# Patient Record
Sex: FEMALE | Race: WHITE | NOT HISPANIC OR LATINO | URBAN - METROPOLITAN AREA
[De-identification: names, ages, dates, MRNs, and addresses within clinical notes are randomized per-mention and may not be internally consistent; named-entity substitution may affect disease eponyms.]

---

## 2017-02-28 ENCOUNTER — OUTPATIENT (OUTPATIENT)
Dept: OUTPATIENT SERVICES | Facility: HOSPITAL | Age: 72
LOS: 1 days | End: 2017-02-28

## 2017-07-25 ENCOUNTER — OUTPATIENT (OUTPATIENT)
Dept: OUTPATIENT SERVICES | Facility: HOSPITAL | Age: 72
LOS: 1 days | End: 2017-07-25

## 2017-09-05 ENCOUNTER — OUTPATIENT (OUTPATIENT)
Dept: OUTPATIENT SERVICES | Facility: HOSPITAL | Age: 72
LOS: 1 days | End: 2017-09-05

## 2022-04-13 ENCOUNTER — APPOINTMENT (OUTPATIENT)
Dept: ORTHOPEDIC SURGERY | Facility: CLINIC | Age: 77
End: 2022-04-13

## 2022-04-13 PROBLEM — Z00.00 ENCOUNTER FOR PREVENTIVE HEALTH EXAMINATION: Status: ACTIVE | Noted: 2022-04-13

## 2022-04-20 ENCOUNTER — TRANSCRIPTION ENCOUNTER (OUTPATIENT)
Age: 77
End: 2022-04-20

## 2022-04-20 ENCOUNTER — APPOINTMENT (OUTPATIENT)
Dept: ORTHOPEDIC SURGERY | Facility: CLINIC | Age: 77
End: 2022-04-20
Payer: MEDICARE

## 2022-04-20 DIAGNOSIS — M25.511 PAIN IN RIGHT SHOULDER: ICD-10-CM

## 2022-04-20 DIAGNOSIS — Z78.9 OTHER SPECIFIED HEALTH STATUS: ICD-10-CM

## 2022-04-20 DIAGNOSIS — S43.431A SUPERIOR GLENOID LABRUM LESION OF RIGHT SHOULDER, INITIAL ENCOUNTER: ICD-10-CM

## 2022-04-20 PROCEDURE — 20611 DRAIN/INJ JOINT/BURSA W/US: CPT | Mod: LT

## 2022-04-20 PROCEDURE — 99204 OFFICE O/P NEW MOD 45 MIN: CPT | Mod: 57,25

## 2022-04-20 PROCEDURE — 99214 OFFICE O/P EST MOD 30 MIN: CPT

## 2022-04-20 NOTE — DISCUSSION/SUMMARY
[de-identified] : 76 yo female presents with left shoulder pain following a fall on 2/11/22 where she suffered a dislocated shoulder. She was intially seen at the ER where she was unconsciously reduced.\par MRI demonstrates no rotator cuff tear\par During todays visit, patient received US guided subacromial injection for therapeutic purposes \par Patient received a PT prescription to be followed according to rotator cuff protocol \par She will them transition to HEP\par Demonstrated supine FF for HEP as well\par Follow up as needed\par \par \par \par (1) We discussed a comprehensive treatment plans that included a prescription management plan involving the use of prescription strength medications to include Ibuprofen 600-800 mg TID, versus 500-650 mg Tylenol. We also discussed prescribing topical diclofenac (Voltaren gel) as well as once daily Meloxicam 15 mg.\par (2) The patient has More Than One chronic injuries/illnesses as outlined, discussed, and documented by ICD 10 codes listed, as well as the HPI and Plan section.\par There is a moderate risk of morbidity with further treatment, especially from use of prescription strength medications and possible side effects of these medications which include upset stomach and cardiac/renal issues with long term use were discussed.\par (3) I recommended that the patient follow-up with their medical physician to discuss any significant specific potential issues with long term use such as interactions with current medications or with exacerbation of underlying medical morbidities. \par \par Attestation:\par I, Beth Mark , attest that this documentation has been prepared under the direction and in the presence of Provider Ezekiel Mulligan MD.\par The documentation recorded by the scribe, in my presence, accurately reflects the service I personally performed, and the decisions made by me with my edits as appropriate.\par Ezekiel Mulligan MD\par \par \par

## 2022-04-20 NOTE — HISTORY OF PRESENT ILLNESS
[de-identified] : 78 yo female presents with left shoulder pain following a fall on 2/11/22 where she suffered a dislocated shoulder. She was intially seen at the ER where she was reduced under sedation. She has significant difficulty with lifting her LUE. Patient denies any PT. Denies any numbness or tingling. She presents with an MRI for review. \par

## 2022-04-20 NOTE — PROCEDURE
[Large Joint Injection] : Large joint injection [Left] : of the left [Subacromial Space] : subacromial space [FreeTextEntry3] : Large Joint Injection was performed because of pain and inflammation. \par Anesthesia: ethyl chloride sprayed topically.. \par Depomedrol: An injection of Depomedrol 40 mg , 1 cc. \par Lidocaine: 7 cc. \par \par Medication was injected in the left subacromial space. Patient has tried OTC's including aspirin, Ibuprofen, Aleve etc or prescription NSAIDS, and/or exercises at home and/ or physical therapy without satisfactory response and Patient has decreased mobility in the joint. After verbal consent using sterile preparation and technique. The risks, benefits, and alternatives to cortisone injection were explained in full to the patient. Risks outlined include but are not limited to infection, sepsis, bleeding, scarring, skin discoloration, temporary increase in pain, syncopal episode, failure to resolve symptoms, allergic reaction, symptom recurrence, and elevation of blood sugar in diabetics. Patient understood the risks. All questions were answered. After discussion of options, patient requested an injection. Oral informed consent was obtained and sterile prep was done of the injection site. Sterile technique was utilized for the procedure including the preparation of the solutions used for the injection. Patient tolerated the procedure well. Advised to ice the injection site this evening. Prep with alcohol locally to site. Sterile technique used. Patient tolerated procedure well. Post Procedure Instructions: Patient was advised to call if redness, pain, or fever occur and apply ice for 15 min. out of every hour for the next 12-24 hours as tolerated. patient was advised to rest the joint(s) for 7 days. \par Ultrasound Guidance was used for the following reasons: prior failure or difficult injection. \par \par Ultrasound guided injection was performed of the shoulder, visualization of the needle and placement of injection was performed without complication.\par \par

## 2022-04-20 NOTE — PHYSICAL EXAM
[de-identified] : Constitutional: The general appearance of the patient is well developed, well nourished, no deformities and well groomed. Normal \par \par Gait: Gait and function is as follows: normal gait. \par \par Skin: Head and neck visualized skin is normal. Left upper extremity visualized skin is normal. Right upper extremity visualized skin is normal. Thoracic Skin of the thoracic spine shows visualized skin is normal. \par \par Cardiovascular: palpable radial pulse bilaterally, good capillary refill in digits of the bilateral upper extremities and no temperature or color changes in the bilateral upper extremities. \par \par Lymphatic: Normal Palpation of lymph nodes in the cervical. \par \par Neurologic: fine motor control in the bilateral upper extremities is intact. Deep Tendon Reflexes in Upper and Lower Extremities Negative Lara's in the bilateral upper extremities. The patient is oriented to time, place and person. Sensation to light touch intact in the bilateral upper extremities. Mood and Affect is normal. \par \par Right Shoulder:  Inspection of the shoulder/upper arm is as follows: There is a full, pain-free, stable range of motion of the shoulder with normal strength and no tenderness to palpation. \par \par Left Shoulder: Inspection of the shoulder/upper arm is as follows: no scapula winging, no biceps deformity and no AC joint deformity. Palpation of the shoulder/upper arm is as follows: There is tenderness at the proximal biceps tendon. Range of motion of the shoulder is as follows: Pain with internal rotation, external rotation, abduction and forward flexion. Strength of the shoulder is as follows: Supraspinatus 4/5. External Rotation 4/5. Internal Rotation 4/5. Deltoid 5/5 Ligament Stability and Special Tests of the shoulder is as follows: Neer test is positive. Tai' test is positive. Speed's test is positive. \par \par Neck: \par Inspection / Palpation of the cervical spine is as follows: There is a full, pain free, stable range of motion of the cervical spine with normal tone and no tenderness to palpation. \par \par \par Back, including spine: Inspection / Palpation of the thoracic/lumbar spine is as follows: There is a full, pain free, stable range of motion of the thoracic spine with a normal tone and not tenderness to palpation..\par

## 2022-07-22 ENCOUNTER — APPOINTMENT (OUTPATIENT)
Dept: ORTHOPEDIC SURGERY | Facility: CLINIC | Age: 77
End: 2022-07-22

## 2022-07-22 DIAGNOSIS — M75.01 ADHESIVE CAPSULITIS OF RIGHT SHOULDER: ICD-10-CM

## 2022-07-22 DIAGNOSIS — S46.011A STRAIN OF MUSCLE(S) AND TENDON(S) OF THE ROTATOR CUFF OF RIGHT SHOULDER, INITIAL ENCOUNTER: ICD-10-CM

## 2022-07-22 PROCEDURE — 99214 OFFICE O/P EST MOD 30 MIN: CPT | Mod: 25

## 2022-07-22 PROCEDURE — 20611 DRAIN/INJ JOINT/BURSA W/US: CPT

## 2022-07-22 NOTE — PHYSICAL EXAM
[de-identified] : Constitutional: The general appearance of the patient is well developed, well nourished, no deformities and well groomed. Normal \par \par Gait: Gait and function is as follows: normal gait. \par \par Skin: Head and neck visualized skin is normal. Left upper extremity visualized skin is normal. Right upper extremity visualized skin is normal. Thoracic Skin of the thoracic spine shows visualized skin is normal. \par \par Cardiovascular: palpable radial pulse bilaterally, good capillary refill in digits of the bilateral upper extremities and no temperature or color changes in the bilateral upper extremities. \par \par Lymphatic: Normal Palpation of lymph nodes in the cervical. \par \par Neurologic: fine motor control in the bilateral upper extremities is intact. Deep Tendon Reflexes in Upper and Lower Extremities Negative Lara's in the bilateral upper extremities. The patient is oriented to time, place and person. Sensation to light touch intact in the bilateral upper extremities. Mood and Affect is normal. \par \par Right Shoulder:  Inspection of the shoulder/upper arm is as follows: There is a full, pain-free, stable range of motion of the shoulder with normal strength and no tenderness to palpation. \par \par Left Shoulder: Inspection of the shoulder/upper arm is as follows: no scapula winging, no biceps deformity and no AC joint deformity. Palpation of the shoulder/upper arm is as follows: There is tenderness at the proximal biceps tendon. Range of motion of the shoulder is as follows: Pain with internal rotation, external rotation, abduction and forward flexion. Strength of the shoulder is as follows: Supraspinatus 4/5. External Rotation 4/5. Internal Rotation 4/5. Deltoid 5/5 Ligament Stability and Special Tests of the shoulder is as follows: Neer test is positive. Tai' test is positive. Speed's test is positive. \par \par Neck: \par Inspection / Palpation of the cervical spine is as follows: There is a full, pain free, stable range of motion of the cervical spine with normal tone and no tenderness to palpation. \par \par \par Back, including spine: Inspection / Palpation of the thoracic/lumbar spine is as follows: There is a full, pain free, stable range of motion of the thoracic spine with a normal tone and not tenderness to palpation..\par

## 2022-07-22 NOTE — DISCUSSION/SUMMARY
[de-identified] : 76 yo female presents with left shoulder pain following a fall on 2/11/22 where she suffered a dislocated shoulder. \par MRI demonstrates no rotator cuff tear\par She was treated today with US guided subacromial injection for therapeutic purposes .\par Also discussed formal course of supervised PT however patient wishes to comlete HEP. \par She did have moderate stiffness on exam today. \par In addition she reports left sided radicular pain and numbness to her left hand. \par We discussed likely etiology of symptoms are cervical spine. She will follow up with pain management to consider trigger point injectoin vs epidural. \par She was prescribed MDp to help alleviate inflammation. \par Follow up as needed\par \par \par (1) We discussed a comprehensive treatment plans that included a prescription management plan involving the use of prescription strength medications to include Ibuprofen 600-800 mg TID, versus 500-650 mg Tylenol. We also discussed prescribing topical diclofenac (Voltaren gel) as well as once daily Meloxicam 15 mg.\par (2) The patient has More Than One chronic injuries/illnesses as outlined, discussed, and documented by ICD 10 codes listed, as well as the HPI and Plan section.\par There is a moderate risk of morbidity with further treatment, especially from use of prescription strength medications and possible side effects of these medications which include upset stomach and cardiac/renal issues with long term use were discussed.\par (3) I recommended that the patient follow-up with their medical physician to discuss any significant specific potential issues with long term use such as interactions with current medications or with exacerbation of underlying medical morbidities. \par \par Attestation:\par I, Beth Mark , attest that this documentation has been prepared under the direction and in the presence of Provider Ezekiel Mulligan MD.\par The documentation recorded by the scribe, in my presence, accurately reflects the service I personally performed, and the decisions made by me with my edits as appropriate.\par Ezekiel Mulligan MD\par \par \par

## 2022-07-22 NOTE — HISTORY OF PRESENT ILLNESS
[de-identified] : 78 yo female presents with left shoulder pain following a fall on 2/11/22 where she suffered a dislocated shoulder. She was intially seen at the ER where she was reduced under sedation. She has significant difficulty with lifting her LUE. Patient denies any PT. Denies any numbness or tingling. She presents with an MRI for review. \par 7/22/22: Patient returns today for follow up of left shoulder pain. She sustained traumatic dislocation 2/11/22. She reports previous cortisone injection provided mild relief. She continues to note significant shoulder pain as well as left sided neck stiffness.  [FreeTextEntry1] : left shoulder pain

## 2022-07-22 NOTE — PROCEDURE
[Large Joint Injection] : Large joint injection [Left] : of the left [Shoulder] : shoulder [FreeTextEntry3] : Large Joint Injection was performed because of pain and inflammation. \par Anesthesia: ethyl chloride sprayed topically.. \par Depomedrol: An injection of Depomedrol 40 mg , 1 cc. \par Lidocaine: 7 cc. \par \par Medication was injected in the left subacromial space. Patient has tried OTC's including aspirin, Ibuprofen, Aleve etc or prescription NSAIDS, and/or exercises at home and/ or physical therapy without satisfactory response and Patient has decreased mobility in the joint. After verbal consent using sterile preparation and technique. The risks, benefits, and alternatives to cortisone injection were explained in full to the patient. Risks outlined include but are not limited to infection, sepsis, bleeding, scarring, skin discoloration, temporary increase in pain, syncopal episode, failure to resolve symptoms, allergic reaction, symptom recurrence, and elevation of blood sugar in diabetics. Patient understood the risks. All questions were answered. After discussion of options, patient requested an injection. Oral informed consent was obtained and sterile prep was done of the injection site. Sterile technique was utilized for the procedure including the preparation of the solutions used for the injection. Patient tolerated the procedure well. Advised to ice the injection site this evening. Prep with alcohol locally to site. Sterile technique used. Patient tolerated procedure well. Post Procedure Instructions: Patient was advised to call if redness, pain, or fever occur and apply ice for 15 min. out of every hour for the next 12-24 hours as tolerated. patient was advised to rest the joint(s) for 7 days. \par Ultrasound Guidance was used for the following reasons: prior failure or difficult injection. \par \par Ultrasound guided injection was performed of the shoulder, visualization of the needle and placement of injection was performed without complication. \par \par

## 2022-08-07 ENCOUNTER — FORM ENCOUNTER (OUTPATIENT)
Age: 77
End: 2022-08-07

## 2022-08-08 ENCOUNTER — APPOINTMENT (OUTPATIENT)
Dept: MRI IMAGING | Facility: CLINIC | Age: 77
End: 2022-08-08

## 2022-08-08 ENCOUNTER — APPOINTMENT (OUTPATIENT)
Dept: ORTHOPEDIC SURGERY | Facility: CLINIC | Age: 77
End: 2022-08-08

## 2022-08-08 VITALS — HEIGHT: 63.5 IN | WEIGHT: 120 LBS | BODY MASS INDEX: 21 KG/M2

## 2022-08-08 DIAGNOSIS — M54.2 CERVICALGIA: ICD-10-CM

## 2022-08-08 PROCEDURE — 72141 MRI NECK SPINE W/O DYE: CPT | Mod: MH

## 2022-08-08 PROCEDURE — 99213 OFFICE O/P EST LOW 20 MIN: CPT

## 2022-08-08 PROCEDURE — 72040 X-RAY EXAM NECK SPINE 2-3 VW: CPT

## 2022-08-09 NOTE — PHYSICAL EXAM
[Normal Coordination] : normal coordination [Normal Mood and Affect] : normal mood and affect [Orientated] : orientated [Able to Communicate] : able to communicate [Normal Skin] : normal skin [Well Developed] : well developed [Well Nourished] : well nourished [Peripheral vascular exam is grossly normal] : peripheral vascular exam is grossly normal [Flexion] : flexion [Extension] : extension [Rotation to left] : rotation to left [Rotation to right] : rotation to right [5___] : right wrist flexors 5[unfilled]/5 [] : no palpable masses [Disc space narrowing] : Disc space narrowing [FreeTextEntry8] : decreased sensation to L axillary patch with palpation.  [FreeTextEntry9] : significantly decreased ROM in all planes.  [FreeTextEntry1] : Severe DDD with complete joint space loss at C5-C6 and C6-C7 [TWNoteComboBox7] : forward flexion 0 degrees

## 2022-08-09 NOTE — DISCUSSION/SUMMARY
[de-identified] : The patient has severe DDD with complete loss of joint space and intermittent LUE radiculopathy. \par \par The diagnosis and treatments were discussed.\par The patient will go for MRI of the cervical spine. \par She was prescribed prednisone for pain relief. \par All risks, benefits, and alternatives were discussed for this medication.\par The patient will follow up with Dr. Holly to review MRI.

## 2022-08-09 NOTE — HISTORY OF PRESENT ILLNESS
[9] : 9 [8] : 8 [Dull/Aching] : dull/aching [Radiating] : radiating [Tightness] : tightness [Constant] : constant [Retired] : Work status: retired [de-identified] : The patient is here for neck pain and decreased ROM of her cervical spine. She was seen previously for a dislocated shoulder via Dr. Mulligan. She received two shoulder injections over the past year with some relief. She reports since last Friday her neck ROM has decreased significantly without new trauma. She has pain in all ROM. She reports headache and pain with eating. She has pain at night and at rest. She has intermittent numbness of her LUE.  [] : no [FreeTextEntry5] : had a fall in feb 2021 has been seeing Dr. Mulligan. Pain started about 2 months in the head, pain started in the neck 4 days ago [FreeTextEntry7] : lt shoulder [FreeTextEntry9] : tylenol [de-identified] : turning head side to side, swallowing, talking

## 2022-08-31 ENCOUNTER — APPOINTMENT (OUTPATIENT)
Dept: ORTHOPEDIC SURGERY | Facility: CLINIC | Age: 77
End: 2022-08-31

## 2022-08-31 VITALS — BODY MASS INDEX: 21 KG/M2 | WEIGHT: 120 LBS | HEIGHT: 63.5 IN

## 2022-08-31 PROCEDURE — 99214 OFFICE O/P EST MOD 30 MIN: CPT

## 2022-08-31 PROCEDURE — 99204 OFFICE O/P NEW MOD 45 MIN: CPT

## 2022-09-06 NOTE — REASON FOR VISIT
[FreeTextEntry2] : Neck pain and headaches on left side\par Mri of cervical spine on 08/08/2022 at [facility]

## 2022-09-06 NOTE — PHYSICAL EXAM
[NL (45)] : right lateral flexion 45 degrees [NL (80)] : right lateral rotation 80 degrees [5___] : right grasp 5[unfilled]/5 [Biceps 2+] : biceps 2+ [Triceps 2+] : triceps 2+ [Brachioradialis 2+] : brachioradialis 2+ [de-identified] : Constitutional:\par - General Appearance:\par Unremarkable\par Body Habitus\par Well Developed\par Well Nourished\par Body Habitus\par No Deformities\par Well Groomed\par Ability To communicate:\par Normal\par Neurologic:\par Global sensation is intact to upper and lower extremities. See examination of Neck and/or Spine\par for exceptions.\par Orientation to Time, Place and Person is: Normal\par Mood And Affect is Normal\par Skin:\par - Head/Face, Right Upper/Lower Extremity, Left Upper/Lower Extremity: Normal\par See Examination of Neck and/or Spine for exceptions\par Cardiovascular:\par Peripheral Cardiovascular System is Normal\par Palpation of Lymph Nodes:\par Normal Palpation of lymph nodes in: Axilla, Cervical, Inguinal\par Abnormal Palpation of lymph nodes in: None  [] : full ROM with pain

## 2022-09-06 NOTE — HISTORY OF PRESENT ILLNESS
[de-identified] : 78 y/o female presents for an initial eval cervical. Patient c/o neck pain and headaches on left side. Reports fall 02/2021. After DOI, numbness/tingling into the LUE. Denies Right sided symptoms. Reports symptoms have stayed the same since the incident. Reports steroid injection with Dr. Mulligan approx 1 month ago. Patient is right handed. Strength in the BUE is normal. She reports problems with her balance. Dexterity is normal. \par \par PMHX\par Scoliosis \par \par

## 2022-09-06 NOTE — DISCUSSION/SUMMARY
[Surgical risks reviewed] : Surgical risks reviewed [de-identified] : Discussed and reviewed results of cervical MRI, and pathology of her symptoms associated with disc herniation / stenosis vs pathology of her shoulder symptoms . I discussed with the patient that since she has had persistent symptoms for almost a year with no improvements, she is a surgical candidate for ACDF c4-5, c5-6. Risks, benefits and expected outcomes have been explained to the patient. Patient was understanding and in agreement. Patient will consider surgery, and will call with any further questions. \par \par Prior to appointment and during encounter with patient extensive medical records were reviewed including but not limited to, hospital records, outpatient records, imaging results, and lab data.During this appointment the patient was examined, diagnoses were discussed and explained in a face to face manner. In addition extensive time was spent reviewing aforementioned diagnostic studies. Counseling including abnormal image results, differential diagnoses, treatment options, risk and benefits, lifestyle changes, current condition, and current medications was performed. Patient's comments, questions, and concerns were addressed and patient verbalized understanding. Based on this patient's presentation at our office, which is an orthopedic spine surgeon's office, this patient inherently / intrinsically has a risk, however minute, of developing issues such as Cauda equina syndrome, bowel and bladder changes, or progression of motor or neurological deficits such as paralysis which may be permanent. \par \par GABBY TITUS Acting as a Scribe for Dr. Qasim IVY, Gabby Titus, attest that this documentation has been prepared under the direction and in the presence of Provider Jose Holly MD.

## 2022-10-18 ENCOUNTER — APPOINTMENT (OUTPATIENT)
Age: 77
End: 2022-10-18

## 2022-10-18 ENCOUNTER — APPOINTMENT (OUTPATIENT)
Dept: ORTHOPEDIC SURGERY | Facility: HOSPITAL | Age: 77
End: 2022-10-18
Payer: MEDICARE

## 2022-10-18 ENCOUNTER — APPOINTMENT (OUTPATIENT)
Dept: ORTHOPEDIC SURGERY | Facility: HOSPITAL | Age: 77
End: 2022-10-18

## 2022-10-18 PROCEDURE — 22551 ARTHRD ANT NTRBDY CERVICAL: CPT | Mod: 62

## 2022-10-18 PROCEDURE — 22845 INSERT SPINE FIXATION DEVICE: CPT

## 2022-10-18 PROCEDURE — 22552 ARTHRD ANT NTRBD CERVICAL EA: CPT | Mod: 62

## 2022-10-18 PROCEDURE — 22853 INSJ BIOMECHANICAL DEVICE: CPT | Mod: 80

## 2022-10-18 PROCEDURE — 22853 INSJ BIOMECHANICAL DEVICE: CPT | Mod: 59

## 2022-10-18 PROCEDURE — 22845 INSERT SPINE FIXATION DEVICE: CPT | Mod: 59,80

## 2022-10-18 PROCEDURE — 22845 INSERT SPINE FIXATION DEVICE: CPT | Mod: 80

## 2022-10-18 PROCEDURE — 22845 INSERT SPINE FIXATION DEVICE: CPT | Mod: 59

## 2022-10-31 ENCOUNTER — APPOINTMENT (OUTPATIENT)
Dept: ORTHOPEDIC SURGERY | Facility: CLINIC | Age: 77
End: 2022-10-31

## 2022-10-31 VITALS — WEIGHT: 120 LBS | HEIGHT: 63.5 IN | BODY MASS INDEX: 21 KG/M2

## 2022-10-31 PROCEDURE — 72040 X-RAY EXAM NECK SPINE 2-3 VW: CPT

## 2022-10-31 PROCEDURE — 99024 POSTOP FOLLOW-UP VISIT: CPT

## 2022-10-31 NOTE — PHYSICAL EXAM
[Implants in position] : Implants in position [No loosening of hardware] : No loosening of hardware [Normal Coordination] : normal coordination [Normal DTR UE/LE] : normal DTR UE/LE  [Normal Sensation] : normal sensation [Normal Mood and Affect] : normal mood and affect [Orientated] : orientated [Able to Communicate] : able to communicate [Normal Skin] : normal skin [No Rash] : no rash [No Ulcers] : no ulcers [No Lesions] : no lesions [No obvious lymphadenopathy in areas examined] : no obvious lymphadenopathy in areas examined [Well Developed] : well developed [Well Nourished] : well nourished [Peripheral vascular exam is grossly normal] : peripheral vascular exam is grossly normal [No Respiratory Distress] : no respiratory distress [de-identified] : Constitutional:\par - General Appearance:\par Unremarkable\par Body Habitus\par Well Developed\par Well Nourished\par Body Habitus\par No Deformities\par Well Groomed\par Ability To communicate:\par Normal\par Neurologic:\par Global sensation is intact to upper and lower extremities. See examination of Neck and/or Spine\par for exceptions.\par Orientation to Time, Place and Person is: Normal\par Mood And Affect is Normal\par Skin:\par - Head/Face, Right Upper/Lower Extremity, Left Upper/Lower Extremity: Normal\par See Examination of Neck and/or Spine for exceptions\par Cardiovascular:\par Peripheral Cardiovascular System is Normal\par Palpation of Lymph Nodes:\par Normal Palpation of lymph nodes in: Axilla, Cervical, Inguinal\par Abnormal Palpation of lymph nodes in: None  [] : no sign of infection [FreeTextEntry3] : Incision healing nicely [FreeTextEntry1] : Good progress toward fusion

## 2022-10-31 NOTE — DISCUSSION/SUMMARY
[de-identified] : Discussed and reviewed results of cervical x-ray taken in office - implants in position, no loosening of hardware, good progress toward fusion. Explained expected outcomes post op including reduction in pain, improvement in function and expected recovery timeframe. All questions were answered to their satisfaction. Patient advised to continue ambulation as best tolerated. Discussed judicious use otc nsaids prn. Patient will follow up in 1 month. Disucssed benefits of transcutaneous fusion stimulator device.\par \par Prior to appointment and during encounter with patient extensive medical records were reviewed including but not limited to, hospital records, outpatient records, imaging results, and lab data.During this appointment the patient was examined, diagnoses were discussed and explained in a face to face manner. In addition extensive time was spent reviewing aforementioned diagnostic studies. Counseling including abnormal image results, differential diagnoses, treatment options, risk and benefits, lifestyle changes, current condition, and current medications was performed. Patient's comments, questions, and concerns were addressed and patient verbalized understanding. Based on this patient's presentation at our office, which is an orthopedic spine surgeon's office, this patient inherently / intrinsically has a risk, however minute, of developing issues such as Cauda equina syndrome, bowel and bladder changes, or progression of motor or neurological deficits such as paralysis which may be permanent.\par \par GABBY ARECHIGA Acting as a Scribe for Gabby Pinto, attest that this documentation has been prepared under the direction and in the presence of Provider Jose Holly MD.

## 2022-10-31 NOTE — HISTORY OF PRESENT ILLNESS
[2] : 2 [Radiating] : radiating [] : yes [Retired] : Work status: retired [de-identified] : 10/31/2022 - 78 y/o female presenting for a post op ACDF C4-5 (10/18/2022). Complains of mild neck stiffness. She reports significant improvement in numbness/tingling in the left arm. Denies incisional pain. Using Advil or Aleve on a prn basis. States her most prominent symptom is fatigue. Hoarse voice. Swallowing is normal.\par \par 08/31/2022 - 78 y/o female presents for an initial eval cervical. Patient c/o neck pain and headaches on left side. Reports fall 02/2021. After DOI, numbness/tingling into the LUE. Denies Right sided symptoms. Reports symptoms have stayed the same since the incident. Reports steroid injection with Dr. Mulligan approx 1 month ago. Patient is right handed. Strength in the BUE is normal. She reports problems with her balance. Dexterity is normal. \par \par PMHX\par 1) Scoliosis \par \par  [FreeTextEntry7] : lt arm [de-identified] : no treatment at this time

## 2022-12-26 ENCOUNTER — APPOINTMENT (OUTPATIENT)
Dept: ORTHOPEDIC SURGERY | Facility: CLINIC | Age: 77
End: 2022-12-26

## 2022-12-26 VITALS — BODY MASS INDEX: 21 KG/M2 | HEIGHT: 63.5 IN | WEIGHT: 120 LBS

## 2022-12-26 DIAGNOSIS — Z78.9 OTHER SPECIFIED HEALTH STATUS: ICD-10-CM

## 2022-12-26 DIAGNOSIS — M50.220 OTHER CERVICAL DISC DISPLACEMENT, MID-CERVICAL REGION, UNSPECIFIED LEVEL: ICD-10-CM

## 2022-12-26 PROCEDURE — 99024 POSTOP FOLLOW-UP VISIT: CPT

## 2022-12-26 PROCEDURE — 72040 X-RAY EXAM NECK SPINE 2-3 VW: CPT

## 2022-12-26 NOTE — DATA REVIEWED
[Cervical Spine] : cervical spine [I independently reviewed and interpreted images and report] : I independently reviewed and interpreted images and report [I reviewed the films/CD and additionally noted] : I reviewed the films/CD and additionally noted [FreeTextEntry1] : In office xrays taken today demonstrate no change in alignment or implant placement, progress toward fusion

## 2022-12-26 NOTE — DISCUSSION/SUMMARY
[de-identified] : Discussed and reviewed results of cervical x-ray taken in office - implants in position, no loosening of hardware, good progress toward fusion. Explained expected outcomes post op including reduction in pain, improvement in function and expected recovery timeframe. All questions were answered to their satisfaction. Patient advised to continue ambulation as best tolerated. Discussed judicious use otc nsaids prn. Discussed benefits of transcutaneous fusion stimulator device, she will continue treatment. I discussed with the patient at length there is likely a component of both shoulder and cervical pathology contributing to symptom complex, advised patient to f/u with Dr. Mulligan. Patient declines formal cervical PT. \par \par Prior to appointment and during encounter with patient extensive medical records were reviewed including but not limited to, hospital records, outpatient records, imaging results, and lab data.During this appointment the patient was examined, diagnoses were discussed and explained in a face to face manner. In addition extensive time was spent reviewing aforementioned diagnostic studies. Counseling including abnormal image results, differential diagnoses, treatment options, risk and benefits, lifestyle changes, current condition, and current medications was performed. Patient's comments, questions, and concerns were addressed and patient verbalized understanding. Based on this patient's presentation at our office, which is an orthopedic spine surgeon's office, this patient inherently / intrinsically has a risk, however minute, of developing issues such as Cauda equina syndrome, bowel and bladder changes, or progression of motor or neurological deficits such as paralysis which may be permanent.\par \par GABBY TITUS Acting as a Scribe for Dr. Qasim IVY, Gabby Titus, attest that this documentation has been prepared under the direction and in the presence of Provider Jose Holly MD.

## 2022-12-26 NOTE — PHYSICAL EXAM
[Left] : left shoulder [Flexion] : flexion [Extension] : extension [Rotation to left] : rotation to left [Rotation to right] : rotation to right [Biceps 2+] : biceps 2+ [Triceps 2+] : triceps 2+ [Brachioradialis 2+] : brachioradialis 2+ [Implants in position] : Implants in position [No loosening of hardware] : No loosening of hardware [de-identified] : Constitutional:\par - General Appearance:\par Unremarkable\par Body Habitus\par Well Developed\par Well Nourished\par Body Habitus\par No Deformities\par Well Groomed\par Ability To communicate:\par Normal\par Neurologic:\par Global sensation is intact to upper and lower extremities. See examination of Neck and/or Spine\par for exceptions.\par Orientation to Time, Place and Person is: Normal\par Mood And Affect is Normal\par Skin:\par - Head/Face, Right Upper/Lower Extremity, Left Upper/Lower Extremity: Normal\par See Examination of Neck and/or Spine for exceptions\par Cardiovascular:\par Peripheral Cardiovascular System is Normal\par Palpation of Lymph Nodes:\par Normal Palpation of lymph nodes in: Axilla, Cervical, Inguinal\par Abnormal Palpation of lymph nodes in: None  [] : negative Lara reflex [FreeTextEntry3] : Incision healing nicely [FreeTextEntry1] : Good progress toward fusion [FreeTextEntry9] : Pain in shoulder with ROM

## 2022-12-26 NOTE — HISTORY OF PRESENT ILLNESS
[8] : 8 [] : Post Surgical Visit: yes [de-identified] : 12/26/2022 - 76 y/o female presenting for a post op ACDF C4-5,5-6 (10/18/2022). Chief complaint is pain in the neck, left shoulder, and arm. She states there is mild improvement in terms of her symptoms preoperatively. She has not been routinely following up with Dr. Mulligan. Incision is healing nicely. Swallowing is normal. Currently using bone stim. \par \par 10/31/2022 - 76 y/o female presenting for a post op ACDF C4-5 (10/18/2022). Complains of mild neck stiffness. She reports significant improvement in numbness/tingling in the left arm. Denies incisional pain. Using Advil or Aleve on a prn basis. States her most prominent symptom is fatigue. Hoarse voice. Swallowing is normal.\par \par 08/31/2022 - 76 y/o female presents for an initial eval cervical. Patient c/o neck pain and headaches on left side. Reports fall 02/2021. After DOI, numbness/tingling into the LUE. Denies Right sided symptoms. Reports symptoms have stayed the same since the incident. Reports steroid injection with Dr. Mulligan approx 1 month ago. Patient is right handed. Strength in the BUE is normal. She reports problems with her balance. Dexterity is normal. \par \par PMHX\par 1) Scoliosis \par \par  [FreeTextEntry1] : C-spine  [de-identified] : none  [de-identified] : 10/18/22 [de-identified] : ACDF C4-5

## 2023-01-02 ENCOUNTER — APPOINTMENT (OUTPATIENT)
Dept: ORTHOPEDIC SURGERY | Facility: CLINIC | Age: 78
End: 2023-01-02

## 2023-01-11 ENCOUNTER — APPOINTMENT (OUTPATIENT)
Dept: ORTHOPEDIC SURGERY | Facility: CLINIC | Age: 78
End: 2023-01-11
Payer: MEDICARE

## 2023-01-11 DIAGNOSIS — M75.52 BURSITIS OF LEFT SHOULDER: ICD-10-CM

## 2023-01-11 DIAGNOSIS — M75.42 IMPINGEMENT SYNDROME OF LEFT SHOULDER: ICD-10-CM

## 2023-01-11 DIAGNOSIS — S43.432A SUPERIOR GLENOID LABRUM LESION OF LEFT SHOULDER, INITIAL ENCOUNTER: ICD-10-CM

## 2023-01-11 PROCEDURE — 99214 OFFICE O/P EST MOD 30 MIN: CPT | Mod: 25

## 2023-01-11 PROCEDURE — 20611 DRAIN/INJ JOINT/BURSA W/US: CPT | Mod: 79,LT

## 2023-01-11 NOTE — HISTORY OF PRESENT ILLNESS
[de-identified] : 76 y/o female presents with left shoulder pain following a fall on 2/11/22 where she suffered a dislocated shoulder. She was initially seen at the ER where she was reduced under sedation. She has significant difficulty with lifting her LUE. Patient denies any PT. Denies any numbness or tingling. She presents with an MRI for review. \par \par 7/22/22: Patient returns today for follow up of left shoulder pain. She sustained traumatic dislocation 2/11/22. She reports previous cortisone injection provided mild relief. She continues to note significant shoulder pain as well as left sided neck stiffness. \par \par 1/11/23: Patient returns today for follow up of left shoulder. Patient continues to have left shoulder pain. She was seen by  and had ACDF C4-5 surgery on 10/18/22. [FreeTextEntry1] : left shoulder pain

## 2023-01-11 NOTE — PHYSICAL EXAM
[de-identified] : Constitutional: The general appearance of the patient is well developed, well nourished, no deformities and well groomed. Normal \par \par Gait: Gait and function is as follows: normal gait. \par \par Skin: Head and neck visualized skin is normal. Left upper extremity visualized skin is normal. Right upper extremity visualized skin is normal. Thoracic Skin of the thoracic spine shows visualized skin is normal. \par \par Cardiovascular: palpable radial pulse bilaterally, good capillary refill in digits of the bilateral upper extremities and no temperature or color changes in the bilateral upper extremities. \par \par Lymphatic: Normal Palpation of lymph nodes in the cervical. \par \par Neurologic: fine motor control in the bilateral upper extremities is intact. Deep Tendon Reflexes in Upper and Lower Extremities Negative Lara's in the bilateral upper extremities. The patient is oriented to time, place and person. Sensation to light touch intact in the bilateral upper extremities. Mood and Affect is normal. \par \par Right Shoulder:  Inspection of the shoulder/upper arm is as follows: There is a full, pain-free, stable range of motion of the shoulder with normal strength and no tenderness to palpation. \par \par Left Shoulder: Inspection of the shoulder/upper arm is as follows: no scapula winging, no biceps deformity and no AC joint deformity. Palpation of the shoulder/upper arm is as follows: There is tenderness at the proximal biceps tendon. Range of motion of the shoulder is as follows: Pain with internal rotation, external rotation, abduction and forward flexion. Strength of the shoulder is as follows: Supraspinatus 4/5. External Rotation 4/5. Internal Rotation 4/5. Deltoid 5/5 Ligament Stability and Special Tests of the shoulder is as follows: Neer test is positive. Tai' test is positive. Speed's test is positive. \par \par Neck: \par Inspection / Palpation of the cervical spine is as follows: There is a full, pain free, stable range of motion of the cervical spine with normal tone and no tenderness to palpation. \par \par \par Back, including spine: Inspection / Palpation of the thoracic/lumbar spine is as follows: There is a full, pain free, stable range of motion of the thoracic spine with a normal tone and not tenderness to palpation..\par

## 2023-01-11 NOTE — DISCUSSION/SUMMARY
[de-identified] : LUE\par + Neer, + Tai\par Pain with resisted abdcution\par Painful arc\par pain and weakness with cuff testing, + Drop arm test\par +TTP LHBT, + Speed \par \par 76 y/o female presents with left shoulder pain following a fall on 2/11/22 where she suffered a dislocated shoulder. \par Previous subacromial injection provided significant relief.\par She received a repeat US guided subacromial injection for therapeutic purposes .\par Also discussed formal course of supervised PT however patient wishes to complete HEP. \par In addition she reports left sided radicular pain and numbness to her left hand. \par If the patients pain is not improved we could consider an MRI or other modalities\par \par (1) We discussed a comprehensive treatment plans that included a prescription management plan involving the use of prescription strength medications to include Ibuprofen 600- 800 mg TID, versus 500- 650 mg Tylenol. We also discussed prescribing topical diclofenac (Voltaren gel) as well as once daily Meloxicam 15 mg.\par (2) The patient has More Than One chronic injuries/illnesses as outlined, discussed, and documented by ICD 10 codes listed, as well as the HPI and Plan section.\par There is a moderate risk of morbidity with further treatment, especially from use of prescription strength medications and possible side effects of these medications which include upset stomach and cardiac/renal issues with long term use were discussed.\par (3) I recommended that the patient follow-up with their medical physician to discuss any significant specific potential issues with long term use such as interactions with current medications or with exacerbation of underlying medical morbidities. \par \par Attestation:\par I, Ermelinda Russell, attest that this documentation has been prepared under the direction and in the presence of Provider Ezekiel Mulligan MD.\par The documentation recorded by the scribe, in my presence, accurately reflects the service I personally performed, and the decisions made by me with my edits as appropriate.\par Ezekiel Mulligan MD\par \par \par

## 2023-01-11 NOTE — PROCEDURE
[FreeTextEntry3] : Large Joint Injection was performed because of pain and inflammation.\par \par Anesthesia: ethyl chloride sprayed topically..\par \par Depomedrol: An injection of Depomedrol 40 mg , 1 cc.\par \par Lidocaine: 7 cc.\par \par Medication was injected in the left subacromial space. Patient has tried OTC's including aspirin, Ibuprofen, Aleve etc or prescription NSAIDS, and/or exercises at home and/ or physical therapy without satisfactory response and Patient has decreased mobility in the joint. After verbal consent using sterile preparation and technique. The risks, benefits, and alternatives to cortisone injection were explained in full to the patient. Risks outlined include but are not limited to infection, sepsis, bleeding, scarring, skin discoloration, temporary increase in pain, syncopal episode, failure to resolve symptoms, allergic reaction, symptom recurrence, and elevation of blood sugar in diabetics. Patient understood the risks. All questions were answered. After discussion of options, patient requested an injection. Oral informed consent was obtained and sterile prep was done of the injection site. Sterile technique was utilized for the procedure including the preparation of the solutions used for the injection. Patient tolerated the procedure well. Advised to ice the injection site this evening. Prep with alcohol locally to site. Sterile technique used. Patient tolerated procedure well. Post Procedure Instructions: Patient was advised to call if redness, pain, or fever occur and apply ice for 15 min. out of every hour for the next 12-24 hours as tolerated. patient was advised to rest the joint(s) for 7 days.\par \par Ultrasound Guidance was used for the following reasons: prior failure or difficult injection.\par \par Ultrasound guided injection was performed of the shoulder, visualization of the needle and placement of injection was performed without complication.\par

## 2023-07-03 ENCOUNTER — APPOINTMENT (OUTPATIENT)
Dept: ORTHOPEDIC SURGERY | Facility: CLINIC | Age: 78
End: 2023-07-03
Payer: MEDICARE

## 2023-07-03 VITALS — WEIGHT: 120 LBS | BODY MASS INDEX: 21 KG/M2 | HEIGHT: 63.5 IN

## 2023-07-03 DIAGNOSIS — M54.12 RADICULOPATHY, CERVICAL REGION: ICD-10-CM

## 2023-07-03 DIAGNOSIS — Z86.59 PERSONAL HISTORY OF OTHER MENTAL AND BEHAVIORAL DISORDERS: ICD-10-CM

## 2023-07-03 DIAGNOSIS — Z87.898 PERSONAL HISTORY OF OTHER SPECIFIED CONDITIONS: ICD-10-CM

## 2023-07-03 PROCEDURE — 72040 X-RAY EXAM NECK SPINE 2-3 VW: CPT

## 2023-07-03 PROCEDURE — 99213 OFFICE O/P EST LOW 20 MIN: CPT

## 2023-07-03 RX ORDER — PREDNISONE 20 MG/1
20 TABLET ORAL DAILY
Qty: 10 | Refills: 0 | Status: DISCONTINUED | COMMUNITY
Start: 2022-08-08 | End: 2023-07-03

## 2023-07-03 RX ORDER — TRAMADOL HYDROCHLORIDE 50 MG/1
50 TABLET, COATED ORAL TWICE DAILY
Qty: 60 | Refills: 0 | Status: DISCONTINUED | COMMUNITY
Start: 2022-12-26 | End: 2023-07-03

## 2023-07-03 RX ORDER — METHYLPREDNISOLONE 4 MG/1
4 TABLET ORAL
Qty: 1 | Refills: 0 | Status: DISCONTINUED | COMMUNITY
Start: 2022-07-22 | End: 2023-07-03

## 2023-07-03 RX ORDER — METHYLPREDNISOLONE 4 MG/1
4 TABLET ORAL
Qty: 1 | Refills: 0 | Status: DISCONTINUED | COMMUNITY
Start: 2022-04-20 | End: 2023-07-03

## 2023-07-03 RX ORDER — OXYCODONE AND ACETAMINOPHEN 10; 325 MG/1; MG/1
10-325 TABLET ORAL
Qty: 60 | Refills: 0 | Status: DISCONTINUED | COMMUNITY
Start: 2022-10-21 | End: 2023-07-03

## 2023-07-03 NOTE — PHYSICAL EXAM
[Flexion] : flexion [Extension] : extension [Rotation to left] : rotation to left [Rotation to right] : rotation to right [Biceps 2+] : biceps 2+ [Triceps 2+] : triceps 2+ [Brachioradialis 2+] : brachioradialis 2+ [Implants in position] : Implants in position [No loosening of hardware] : No loosening of hardware [Left] : left shoulder [Normal Coordination] : normal coordination [Normal DTR UE/LE] : normal DTR UE/LE  [Normal Sensation] : normal sensation [Normal Mood and Affect] : normal mood and affect [Orientated] : orientated [Able to Communicate] : able to communicate [Normal Skin] : normal skin [No Rash] : no rash [No Ulcers] : no ulcers [No Lesions] : no lesions [No obvious lymphadenopathy in areas examined] : no obvious lymphadenopathy in areas examined [Well Developed] : well developed [Peripheral vascular exam is grossly normal] : peripheral vascular exam is grossly normal [No Respiratory Distress] : no respiratory distress [de-identified] : Constitutional:\par - General Appearance:\par Unremarkable\par Body Habitus\par Well Developed\par Well Nourished\par Body Habitus\par No Deformities\par Well Groomed\par Ability To communicate:\par Normal\par Neurologic:\par Global sensation is intact to upper and lower extremities. See examination of Neck and/or Spine\par for exceptions.\par Orientation to Time, Place and Person is: Normal\par Mood And Affect is Normal\par Skin:\par - Head/Face, Right Upper/Lower Extremity, Left Upper/Lower Extremity: Normal\par See Examination of Neck and/or Spine for exceptions\par Cardiovascular:\par Peripheral Cardiovascular System is Normal\par Palpation of Lymph Nodes:\par Normal Palpation of lymph nodes in: Axilla, Cervical, Inguinal\par Abnormal Palpation of lymph nodes in: None  [] : negative Lara reflex [FreeTextEntry3] : Incision healing nicely [FreeTextEntry1] : Good progress toward fusion [FreeTextEntry9] : Pain in shoulder with ROM

## 2023-07-03 NOTE — HISTORY OF PRESENT ILLNESS
[1] : 2 [Retired] : Work status: retired [de-identified] : 07/03/2023 -  78 y/o female presenting for a follow up, s/p ACDF C4-5,5-6 (10/18/2022). Pleased with her progressed post operatively, reports zero radicular upper extremity symptoms. Chief complaint of left sided neck stiffness. UE strength is intact. Had consult with Dr. Mulligan in January for left shoulder - received CSI with relief. Diagnosed with sleep apnea, c/o difficulty sleeping. \par \par 12/26/2022 - 78 y/o female presenting for a post op ACDF C4-5,5-6 (10/18/2022). Chief complaint is pain in the neck, left shoulder, and arm. She states there is mild improvement in terms of her symptoms preoperatively. She has not been routinely following up with Dr. Mulligan. Incision is healing nicely. Swallowing is normal. Currently using bone stim. \par \par 10/31/2022 - 78 y/o female presenting for a post op ACDF C4-5 (10/18/2022). Complains of mild neck stiffness. She reports significant improvement in numbness/tingling in the left arm. Denies incisional pain. Using Advil or Aleve on a prn basis. States her most prominent symptom is fatigue. Hoarse voice. Swallowing is normal.\par \par 08/31/2022 - 78 y/o female presents for an initial eval cervical. Patient c/o neck pain and headaches on left side. Reports fall 02/2021. After DOI, numbness/tingling into the LUE. Denies Right sided symptoms. Reports symptoms have stayed the same since the incident. Reports steroid injection with Dr. Mulligan approx 1 month ago. Patient is right handed. Strength in the BUE is normal. She reports problems with her balance. Dexterity is normal. \par \par PMHX\par 1) Scoliosis \par \par  [de-identified] : no treatment at this time

## 2023-07-03 NOTE — DISCUSSION/SUMMARY
[de-identified] : 79 y/o F s/p  ACDF C4-5,5-6 (10/18/2022)\par Discussed and reviewed results of cervical x-ray taken in office - implants in position, no loosening of hardware. Patient advised to continue ambulation as best tolerated. Likely a component of both shoulder and cervical pathology contributing to symptom complex as patient reports sustained relief following CSI into shoulder in 01/2023. \par \par Prior to appointment and during encounter with patient extensive medical records were reviewed including but not limited to, hospital records, outpatient records, imaging results, and lab data.During this appointment the patient was examined, diagnoses were discussed and explained in a face to face manner. In addition extensive time was spent reviewing aforementioned diagnostic studies. Counseling including abnormal image results, differential diagnoses, treatment options, risk and benefits, lifestyle changes, current condition, and current medications was performed. Patient's comments, questions, and concerns were addressed and patient verbalized understanding. Based on this patient's presentation at our office, which is an orthopedic spine surgeon's office, this patient inherently / intrinsically has a risk, however minute, of developing issues such as Cauda equina syndrome, bowel and bladder changes, or progression of motor or neurological deficits such as paralysis which may be permanent.\par \par GABBY TITUS Acting as a Scribe for Dr. Qasim IVY, Gabby Titus, attest that this documentation has been prepared under the direction and in the presence of Provider Jose Holly MD.